# Patient Record
Sex: FEMALE | Race: WHITE | NOT HISPANIC OR LATINO | Employment: UNEMPLOYED | URBAN - METROPOLITAN AREA
[De-identification: names, ages, dates, MRNs, and addresses within clinical notes are randomized per-mention and may not be internally consistent; named-entity substitution may affect disease eponyms.]

---

## 2019-06-14 ENCOUNTER — OFFICE VISIT (OUTPATIENT)
Dept: URGENT CARE | Facility: PHYSICIAN GROUP | Age: 34
End: 2019-06-14
Payer: COMMERCIAL

## 2019-06-14 VITALS
DIASTOLIC BLOOD PRESSURE: 80 MMHG | BODY MASS INDEX: 31.39 KG/M2 | TEMPERATURE: 98.6 F | OXYGEN SATURATION: 96 % | HEIGHT: 67 IN | WEIGHT: 200 LBS | SYSTOLIC BLOOD PRESSURE: 122 MMHG | HEART RATE: 105 BPM

## 2019-06-14 DIAGNOSIS — J30.1 SEASONAL ALLERGIC RHINITIS DUE TO POLLEN: ICD-10-CM

## 2019-06-14 DIAGNOSIS — H60.91 RECURRENT OTITIS EXTERNA, RIGHT: ICD-10-CM

## 2019-06-14 DIAGNOSIS — H10.13 ALLERGIC CONJUNCTIVITIS OF BOTH EYES: ICD-10-CM

## 2019-06-14 PROCEDURE — 99203 OFFICE O/P NEW LOW 30 MIN: CPT | Performed by: PHYSICIAN ASSISTANT

## 2019-06-14 RX ORDER — OLOPATADINE HYDROCHLORIDE 1 MG/ML
1 SOLUTION/ DROPS OPHTHALMIC 2 TIMES DAILY
Qty: 5 ML | Refills: 0 | Status: SHIPPED | OUTPATIENT
Start: 2019-06-14 | End: 2019-07-15

## 2019-06-14 RX ORDER — FLUTICASONE PROPIONATE 50 MCG
1 SPRAY, SUSPENSION (ML) NASAL DAILY
COMMUNITY
End: 2019-07-15

## 2019-06-14 NOTE — PROGRESS NOTES
"Subjective:   Oscar Randall is a 33 y.o. female who presents for Ear Pain (R ear pain, painful, pt states she believes she got water in it, painful to swallow, 1 day )    This is a new problem.  Patient presents to urgent care with concern for possible ear infection in the right ear.  Patient reports a history of recurrent \"double ear infections\" from getting water in her ears.  She states that she tries very hard to keep water and moisture out of the ear however she has just moved into an area that has a different type of shower and while bending over to shave her legs she got water in her ear yesterday.  She tried putting hydrogen peroxide into her ear in an effort to try this out.  However, she has developed pain in the right ear.  The pain hurts to swallow.  Patient denies any recent illness.  Denies fever.    Patient does report issues with increase in allergy symptoms including sneezing, itchy nose and itchy burning eyes.  Patient has been taking Zyrtec-D and fluticasone nasal spray with some improvement in symptoms.  However, she is being very bothered by the burning of the eyes.  She states that several times she has had to pull over while driving because her eyes are stinging and burning.  Patient denies any eye pain or foreign body sensation.  Denies any discharge.    Past medical history, family history and social history are reviewed and updated in the record today.         HPI  Review of Systems   Constitutional: Negative for chills, fever and malaise/fatigue.   HENT: Positive for congestion and ear pain.    Eyes: Positive for redness.   Neurological: Negative for dizziness.   All other systems reviewed and are negative.    Allergies   Allergen Reactions   • Amoxicillin         Objective:   /80 (BP Location: Right arm, Patient Position: Sitting, BP Cuff Size: Adult)   Pulse (!) 105   Temp 37 °C (98.6 °F) (Temporal)   Ht 1.702 m (5' 7\")   Wt 90.7 kg (200 lb)   SpO2 96%   BMI 31.32 kg/m² "   Physical Exam   Constitutional: She is oriented to person, place, and time. She appears well-developed and well-nourished. She does not appear ill. No distress.   HENT:   Head: Normocephalic and atraumatic.   Right Ear: Tympanic membrane and external ear normal.   Left Ear: Tympanic membrane, external ear and ear canal normal.   Nose: Nose normal.   Mouth/Throat: Uvula is midline, oropharynx is clear and moist and mucous membranes are normal. No oropharyngeal exudate.   Turbinates edematous and boggy   Right EAC with mild erythema with white debris, mild tragal tenderness on the right   Eyes: Pupils are equal, round, and reactive to light. EOM are normal. Right eye exhibits no discharge and no exudate. Left eye exhibits no discharge and no exudate. Right conjunctiva is injected. Left conjunctiva is injected.   Neck: Normal range of motion. Neck supple.   Cardiovascular: Normal rate, regular rhythm and normal heart sounds.  Exam reveals no gallop and no friction rub.    No murmur heard.  Pulmonary/Chest: Effort normal and breath sounds normal. No respiratory distress. She has no wheezes. She has no rales.   Abdominal: Soft. Bowel sounds are normal. She exhibits no distension and no mass. There is no tenderness. There is no rebound and no guarding.   Musculoskeletal: Normal range of motion. She exhibits no edema, tenderness or deformity.   Lymphadenopathy:        Head (right side): No submental, no submandibular, no tonsillar, no preauricular and no posterior auricular adenopathy present.        Head (left side): No submental, no submandibular, no tonsillar, no preauricular and no posterior auricular adenopathy present.     She has no cervical adenopathy.        Right: No supraclavicular adenopathy present.        Left: No supraclavicular adenopathy present.   Neurological: She is alert and oriented to person, place, and time. She has normal strength. No cranial nerve deficit or sensory deficit. Coordination normal.    Skin: Skin is warm and dry. No rash noted.   Psychiatric: She has a normal mood and affect. Judgment normal.   Vitals reviewed.         Assessment/Plan:   Assessment    1. Recurrent otitis externa, right  - neomycin sulf/polymyx B sulf/HC soln (CORTISPORIN HC SOL) 3.5-39055-0 Solution; Place 3 Drops in ear 4 times a day.  Dispense: 1 Bottle; Refill: 0    2. Seasonal allergic rhinitis due to pollen    3. Allergic conjunctivitis of both eyes  - olopatadine (PATANOL) 0.1 % ophthalmic solution; Place 1 Drop in both eyes 2 times a day.  Dispense: 5 mL; Refill: 0      Patient will be treated with Cortisporin otic suspension as above.  Continue over-the-counter nonsedating antihistamine and nasal steroid.  Patient is also given an additional prescription for Patanol with recommendation to follow-up with optometry/ophthalmology.      Differential diagnosis, natural history, supportive care, and indications for immediate follow-up discussed.      If not improving in 3-5 days, F/U with PCP or return to  or sooner if worsens  Red flag warning symptoms and strict ER/follow-up precautions given.    Please note that this note was created using voice recognition speech to text software. Every effort has been made to correct obvious errors.  However, I expect there are errors of grammar and possibly context that were not discovered prior to finalizing the note    LIDIA Paige PA-C

## 2019-06-14 NOTE — PATIENT INSTRUCTIONS
"Otitis Externa  Otitis externa is a germ infection in the outer ear. The outer ear is the area from the eardrum to the outside of the ear. Otitis externa is sometimes called \"swimmer's ear.\"  HOME CARE  · Put drops in the ear as told by your doctor.  · Only take medicine as told by your doctor.  · If you have diabetes, your doctor may give you more directions. Follow your doctor's directions.  · Keep all doctor visits as told.  To avoid another infection:  · Keep your ear dry. Use the corner of a towel to dry your ear after swimming or bathing.  · Avoid scratching or putting things inside your ear.  · Avoid swimming in lakes, dirty water, or pools that use a chemical called chlorine poorly.  · You may use ear drops after swimming. Combine equal amounts of white vinegar and alcohol in a bottle. Put 3 or 4 drops in each ear.  GET HELP IF:   · You have a fever.  · Your ear is still red, puffy (swollen), or painful after 3 days.  · You still have yellowish-white fluid (pus) coming from the ear after 3 days.  · Your redness, puffiness, or pain gets worse.  · You have a really bad headache.  · You have redness, puffiness, pain, or tenderness behind your ear.  MAKE SURE YOU:   · Understand these instructions.  · Will watch your condition.  · Will get help right away if you are not doing well or get worse.  This information is not intended to replace advice given to you by your health care provider. Make sure you discuss any questions you have with your health care provider.  Document Released: 06/05/2009 Document Revised: 01/08/2016 Document Reviewed: 09/26/2016  [employer] Interactive Patient Education © 2017 [employer]  Allergic Conjunctivitis  A clear membrane (conjunctiva) covers the white part of your eye and the inner surface of your eyelid. Allergic conjunctivitis happens when this membrane has inflammation. This is caused by allergies. Common causes of allergic reactions (allergens)include:  · Outdoor allergens, " such as:  ¨ Pollen.  ¨ Grass and weeds.  ¨ Mold spores.  · Indoor allergens, such as:  ¨ Dust.  ¨ Smoke.  ¨ Mold.  ¨ Pet dander.  ¨ Animal hair.  This condition can make your eye red or pink. It can also make your eye feel itchy. This condition cannot be spread from one person to another person (is not contagious).  Follow these instructions at home:  · Try not to be around things that you are allergic to.  · Take or apply over-the-counter and prescription medicines only as told by your doctor. These include any eye drops.  · Place a cool, clean washcloth on your eye for 10-20 minutes. Do this 3-4 times a day.  · Do not touch or rub your eyes.  · Do not wear contact lenses until the inflammation is gone. Wear glasses instead.  · Do not wear eye makeup until the inflammation is gone.  · Keep all follow-up visits as told by your doctor. This is important.  Contact a doctor if:  · Your symptoms get worse.  · Your symptoms do not get better with treatment.  · You have mild eye pain.  · You are sensitive to light,  · You have spots or blisters on your eyes.  · You have pus coming from your eye.  · You have a fever.  Get help right away if:  · You have redness, swelling, or other symptoms in only one eye.  · Your vision is blurry.  · You have vision changes.  · You have very bad eye pain.  Summary  · Allergic conjunctivitis is caused by allergies. It can make your eye red or pink, and it can make your eye feel itchy.  · This condition cannot be spread from one person to another person (is not contagious).  · Try not to be around things that you are allergic to.  · Take or apply over-the-counter and prescription medicines only as told by your doctor. These include any eye drops.  · Contact your doctor if your symptoms get worse or they do not get better with treatment.  This information is not intended to replace advice given to you by your health care provider. Make sure you discuss any questions you have with your health  care provider.  Document Released: 06/07/2011 Document Revised: 08/11/2017 Document Reviewed: 08/11/2017  [employer] Interactive Patient Education © 2017 [employer]

## 2019-06-15 ASSESSMENT — ENCOUNTER SYMPTOMS
FEVER: 0
DIZZINESS: 0
EYE REDNESS: 1
CHILLS: 0

## 2019-07-15 ENCOUNTER — OFFICE VISIT (OUTPATIENT)
Dept: URGENT CARE | Facility: PHYSICIAN GROUP | Age: 34
End: 2019-07-15
Payer: COMMERCIAL

## 2019-07-15 ENCOUNTER — APPOINTMENT (OUTPATIENT)
Dept: RADIOLOGY | Facility: IMAGING CENTER | Age: 34
End: 2019-07-15
Attending: NURSE PRACTITIONER
Payer: COMMERCIAL

## 2019-07-15 VITALS
RESPIRATION RATE: 16 BRPM | SYSTOLIC BLOOD PRESSURE: 128 MMHG | TEMPERATURE: 98.8 F | HEIGHT: 67 IN | BODY MASS INDEX: 30.76 KG/M2 | OXYGEN SATURATION: 96 % | WEIGHT: 196 LBS | DIASTOLIC BLOOD PRESSURE: 72 MMHG | HEART RATE: 74 BPM

## 2019-07-15 DIAGNOSIS — S99.911A INJURY OF RIGHT ANKLE, INITIAL ENCOUNTER: ICD-10-CM

## 2019-07-15 DIAGNOSIS — S82.831A CLOSED FRACTURE OF DISTAL END OF RIGHT FIBULA, UNSPECIFIED FRACTURE MORPHOLOGY, INITIAL ENCOUNTER: ICD-10-CM

## 2019-07-15 PROCEDURE — 99214 OFFICE O/P EST MOD 30 MIN: CPT | Performed by: NURSE PRACTITIONER

## 2019-07-15 PROCEDURE — 73610 X-RAY EXAM OF ANKLE: CPT | Mod: TC,RT | Performed by: NURSE PRACTITIONER

## 2019-07-15 RX ORDER — LORATADINE 10 MG/1
CAPSULE, LIQUID FILLED ORAL
COMMUNITY

## 2019-07-15 RX ORDER — HYDROCODONE BITARTRATE AND ACETAMINOPHEN 5; 325 MG/1; MG/1
1-2 TABLET ORAL EVERY 6 HOURS PRN
Qty: 15 TAB | Refills: 0 | Status: SHIPPED | OUTPATIENT
Start: 2019-07-15 | End: 2019-07-20

## 2019-07-15 NOTE — PROGRESS NOTES
"  Chief Complaint   Patient presents with   • Ankle Injury     heard a crack, fell down stairs X today        HISTORY OF PRESENT ILLNESS: Patient is a 34 y.o. female who presents to urgent care today with complaints of right ankle pain. Notes she accidentally tripped while ambulating down stairs earlier today. She felt her right foot pull backwards, felt a \"crack\" to her ankle and has had pain and swelling to the ankle since.  The pain and swelling is lateral.  Been unable to ambulate due to pain.  Denies previous injuries to this ankle.  Denies other areas of injury or trauma.  She is tried ice and rest for symptom relief.    There are no active problems to display for this patient.      Allergies:Amoxicillin    Current Outpatient Prescriptions Ordered in [facility]   Medication Sig Dispense Refill   • Cetirizine-Pseudoephedrine (ZYRTEC-D PO) Take  by mouth.     • Loratadine 10 MG Cap Take  by mouth.       No current Epic-ordered facility-administered medications on file.        Past Medical History:   Diagnosis Date   • Allergy    • Asthma        Social History   Substance Use Topics   • Smoking status: Never Smoker   • Smokeless tobacco: Never Used   • Alcohol use Not on file       Family Status   Relation Status   • Mo Alive   • Fa Alive     Family History   Problem Relation Age of Onset   • Hypertension Mother    • Heart Disease Mother        ROS:  Review of Systems   Constitutional: Negative for fever, chills, weight loss, malaise, and fatigue.   HENT: Negative for ear pain, nosebleeds, congestion, sore throat and neck pain.    Eyes: Negative for vision changes.   Neuro: Negative for headache, sensory changes, weakness, seizure, LOC.   Cardiovascular: Negative for chest pain, palpitations, orthopnea and leg swelling.   Respiratory: Negative for cough, sputum production, shortness of breath and wheezing.   Gastrointestinal: Negative for abdominal pain, nausea, vomiting or diarrhea.   Genitourinary: Negative for " "dysuria, urgency and frequency.  Musculoskeletal: Positive for right ankle pain.  Negative for falls, neck pain, back pain, myalgias.   Skin: Negative for rash, diaphoresis.     Exam:  /72   Pulse 74   Temp 37.1 °C (98.8 °F)   Resp 16   Ht 1.702 m (5' 7\")   Wt 88.9 kg (196 lb)   SpO2 96%   General: well-nourished, well-developed female in NAD  Head: normocephalic, atraumatic  Eyes: PERRLA, no conjunctival injection, acuity grossly intact, lids normal.  Ears: normal shape and symmetry, no tenderness, no discharge. External canals are without any significant edema or erythema. Tympanic membranes are without any inflammation, no effusion. Gross auditory acuity is intact.  Nose: symmetrical without tenderness, no discharge.  Mouth/Throat: reasonable hygiene, no erythema, exudates or tonsillar enlargement.  Neck: no masses, range of motion within normal limits, no tracheal deviation. No obvious thyroid enlargement.   Lymph: no cervical adenopathy. No supraclavicular adenopathy.   Neuro: alert and oriented. Cranial nerves 1-12 grossly intact. No sensory deficit.   Cardiovascular: regular rate and rhythm. No edema.  Pulmonary: no distress. Chest is symmetrical with respiration, no wheezes, crackles, or rhonchi.   Musculoskeletal: no clubbing, appropriate muscle tone, gait is antalgic. Right ankle has swelling and tenderness to lateral ankle. Limited ROM secondary to pain. N/V intact. Skin intact. Cap refill brisk.  There is no tenderness to right foot or proximal right tibia or fibula.  Skin: warm, dry, intact, no clubbing, no cyanosis, no rashes.   Psych: appropriate mood, affect, judgement.         Assessment/Plan:  1. Closed fracture of distal end of right fibula, unspecified fracture morphology, initial encounter  DX-ANKLE 3+ VIEWS RIGHT    REFERRAL TO ORTHOPEDICS    HYDROcodone-acetaminophen (NORCO) 5-325 MG Tab per tablet    Consent for Opiate Prescription         DX ankle reviewed by myself, radiology " "reading \"Distal right fibular fracture possible ankle joint ligamentous injury.\"        X-ray notes distal fibular fracture. Placed in tall walking boot, given crutches, instructed to be non weight bearing. RICE. OTC NSAIDS. [place] for breath through pain, sedative effects of medication discussed with patient. Consent obtained. Referral placed to ortho, F/U this week. [place]  Aware web site evaluation: I have obtained and reviewed patient utilization report from [facility] pharmacy database prior to writing prescription for controlled substance II, III or IV per [place] bill . Based on the report and my clinical assessment the prescription is medically necessary.   Supportive care, differential diagnoses, and indications for immediate follow-up discussed with patient.   Pathogenesis of diagnosis discussed including typical length and natural progression.   Instructed to return to clinic or nearest emergency department for any change in condition, further concerns, or worsening of symptoms.  Patient states understanding of the plan of care and discharge instructions.          Please note that this dictation was created using voice recognition software. I have made every reasonable attempt to correct obvious errors, but I expect that there are errors of grammar and possibly content that I did not discover before finalizing the note.      YESSENIA Oswald.     "

## 2019-12-02 ENCOUNTER — TELEPHONE (OUTPATIENT)
Dept: SCHEDULING | Facility: IMAGING CENTER | Age: 34
End: 2019-12-02

## 2019-12-04 ENCOUNTER — APPOINTMENT (OUTPATIENT)
Dept: RADIOLOGY | Facility: IMAGING CENTER | Age: 34
End: 2019-12-04
Attending: PHYSICIAN ASSISTANT
Payer: COMMERCIAL

## 2019-12-04 ENCOUNTER — OFFICE VISIT (OUTPATIENT)
Dept: URGENT CARE | Facility: PHYSICIAN GROUP | Age: 34
End: 2019-12-04
Payer: COMMERCIAL

## 2019-12-04 VITALS
RESPIRATION RATE: 16 BRPM | HEART RATE: 98 BPM | DIASTOLIC BLOOD PRESSURE: 78 MMHG | SYSTOLIC BLOOD PRESSURE: 122 MMHG | TEMPERATURE: 97.8 F | BODY MASS INDEX: 30.7 KG/M2 | HEIGHT: 67 IN | OXYGEN SATURATION: 98 %

## 2019-12-04 DIAGNOSIS — M25.511 ACUTE PAIN OF RIGHT SHOULDER: ICD-10-CM

## 2019-12-04 DIAGNOSIS — M75.31 CALCIFIC TENDINITIS OF RIGHT SHOULDER: Primary | ICD-10-CM

## 2019-12-04 DIAGNOSIS — M75.41 IMPINGEMENT SYNDROME OF RIGHT SHOULDER: ICD-10-CM

## 2019-12-04 PROCEDURE — 20610 DRAIN/INJ JOINT/BURSA W/O US: CPT | Performed by: PHYSICIAN ASSISTANT

## 2019-12-04 PROCEDURE — 73030 X-RAY EXAM OF SHOULDER: CPT | Mod: TC,RT | Performed by: PHYSICIAN ASSISTANT

## 2019-12-04 PROCEDURE — 99214 OFFICE O/P EST MOD 30 MIN: CPT | Mod: 25 | Performed by: PHYSICIAN ASSISTANT

## 2019-12-04 NOTE — PATIENT INSTRUCTIONS
Shoulder Impingement Syndrome  Shoulder impingement syndrome is a condition that causes pain when connective tissues (tendons) surrounding the shoulder joint become pinched. These tendons are part of the group of muscles and tissues that help to stabilize the shoulder (rotator cuff). Beneath the rotator cuff is a fluid-filled sac (bursa) that allows the muscles and tendons to glide smoothly. The bursa may become swollen or irritated (bursitis). Bursitis, swelling in the rotator cuff tendons, or both conditions can decrease how much space is under a bone in the shoulder joint (acromion), resulting in impingement.  What are the causes?  Shoulder impingement syndrome can be caused by bursitis or swelling of the rotator cuff tendons, which may result from:  · Repetitive overhead arm movements.  · Falling onto the shoulder.  · Weakness in the shoulder muscles.  What increases the risk?  You may be more likely to develop this condition if you are an athlete who participates in:  · Sports that involve throwing, such as baseball.  · Tennis.  · Swimming.  · Volleyball.  Some people are also more likely to develop impingement syndrome because of the shape of their acromion bone.  What are the signs or symptoms?  The main symptom of this condition is pain on the front or side of the shoulder. Pain may:  · Get worse when lifting or raising the arm.  · Get worse at night.  · Wake you up from sleeping.  · Feel sharp when the shoulder is moved, and then fade to an ache.  Other signs and symptoms may include:  · Tenderness.  · Stiffness.  · Inability to raise the arm above shoulder level or behind the body.  · Weakness.  How is this diagnosed?  This condition may be diagnosed based on:  · Your symptoms.  · Your medical history.  · A physical exam.  · Imaging tests, such as:  ¨ X-rays.  ¨ MRI.  ¨ Ultrasound.  How is this treated?  Treatment for this condition may include:  · Resting your shoulder and avoiding all activities that  cause pain or put stress on the shoulder.  · Icing your shoulder.  · NSAIDs to help reduce pain and swelling.  · One or more injections of medicines to numb the area and reduce inflammation.  · Physical therapy.  · Surgery. This may be needed if nonsurgical treatments have not helped. Surgery may involve repairing the rotator cuff, reshaping the acromion, or removing the bursa.  Follow these instructions at home:  Managing pain, stiffness, and swelling  · If directed, apply ice to the injured area.  ¨ Put ice in a plastic bag.  ¨ Place a towel between your skin and the bag.  ¨ Leave the ice on for 20 minutes, 2-3 times a day.  Activity  · Rest and return to your normal activities as told by your health care provider. Ask your health care provider what activities are safe for you.  · Do exercises as told by your health care provider.  General instructions  · Do not use any tobacco products, including cigarettes, chewing tobacco, or e-cigarettes. Tobacco can delay healing. If you need help quitting, ask your health care provider.  · Ask your health care provider when it is safe for you to drive.  · Take over-the-counter and prescription medicines only as told by your health care provider.  · Keep all follow-up visits as told by your health care provider. This is important.  How is this prevented?  · Give your body time to rest between periods of activity.  · Be safe and responsible while being active to avoid falls.  · Maintain physical fitness, including strength and flexibility.  Contact a health care provider if:  · Your symptoms have not improved after 1-2 months of treatment and rest.  · You cannot lift your arm away from your body.  This information is not intended to replace advice given to you by your health care provider. Make sure you discuss any questions you have with your health care provider.  Document Released: 12/18/2006 Document Revised: 08/24/2017 Document Reviewed: 11/19/2016  [employer]  Patient Education © 2017 [employer]     Statement Selected

## 2019-12-04 NOTE — PROGRESS NOTES
"Subjective:      Pt is a 34 y.o. female who presents with Shoulder Pain (right shoulder pain not allowing her to sleep, dose not remember hitting it or anything, x1 month )            HPI  This is a new problem. Pt notes she \"slept wrong\" on her right shoulder 1 month ago and now notes 1 month of pain of right shoulder with movement at or above shoulder height. Pt has not taken any Rx medications for this condition. Pt states the pain is a 7/10, aching in nature and worse during the day. Pt denies CP, SOB, NVD, paresthesias, headaches, dizziness, change in vision, hives, or other joint pain. The pt's medication list, problem list, and allergies have been evaluated and reviewed during today's visit.    PMH:  Past Medical History:   Diagnosis Date   • Allergy    • Asthma        PSH:  Past Surgical History:   Procedure Laterality Date   • BLADDER SUSPENSION         Fam Hx:    family history includes Heart Disease in her mother; Hypertension in her mother.  Family Status   Relation Name Status   • Mo  Alive   • Fa  Alive       Soc HX:  Social History     Socioeconomic History   • Marital status:      Spouse name: Not on file   • Number of children: Not on file   • Years of education: Not on file   • Highest education level: Not on file   Occupational History   • Not on file   Social Needs   • Financial resource strain: Not on file   • Food insecurity:     Worry: Not on file     Inability: Not on file   • Transportation needs:     Medical: Not on file     Non-medical: Not on file   Tobacco Use   • Smoking status: Never Smoker   • Smokeless tobacco: Never Used   Substance and Sexual Activity   • Alcohol use: Not on file   • Drug use: No   • Sexual activity: Not on file   Lifestyle   • Physical activity:     Days per week: Not on file     Minutes per session: Not on file   • Stress: Not on file   Relationships   • Social connections:     Talks on phone: Not on file     Gets together: Not on file     Attends " "Restorationist service: Not on file     Active member of club or organization: Not on file     Attends meetings of clubs or organizations: Not on file     Relationship status: Not on file   • Intimate partner violence:     Fear of current or ex partner: Not on file     Emotionally abused: Not on file     Physically abused: Not on file     Forced sexual activity: Not on file   Other Topics Concern   • Not on file   Social History Narrative   • Not on file         Medications:    Current Outpatient Medications:   •  Loratadine 10 MG Cap, Take  by mouth., Disp: , Rfl:   •  Cetirizine-Pseudoephedrine (ZYRTEC-D PO), Take  by mouth., Disp: , Rfl:       Allergies:  Amoxicillin    ROS    Constitutional: Negative for fever, chills and malaise/fatigue.   HENT: Negative for congestion and sore throat.    Eyes: Negative for blurred vision, double vision and photophobia.   Respiratory: Negative for cough and shortness of breath.  Cardiovascular: Negative for chest pain and palpitations.   Gastrointestinal: Negative for heartburn, nausea, vomiting, abdominal pain, diarrhea and constipation.   Genitourinary: Negative for dysuria and flank pain.   Musculoskeletal: POS for right shoulder joint pain and myalgias.   Skin: Negative for itching and rash.   Neurological: Negative for dizziness, tingling and headaches.   Endo/Heme/Allergies: Does not bruise/bleed easily.   Psychiatric/Behavioral: Negative for depression. The patient is not nervous/anxious.         Objective:     /78 (BP Location: Right arm, Patient Position: Sitting, BP Cuff Size: Adult)   Pulse 98   Temp 36.6 °C (97.8 °F) (Temporal)   Resp 16   Ht 1.702 m (5' 7\")   SpO2 98%   BMI 30.70 kg/m²      Physical Exam  Musculoskeletal:      Right shoulder: She exhibits decreased range of motion, tenderness, bony tenderness, pain and decreased strength. She exhibits no swelling, no effusion, no crepitus, no deformity, no laceration, no spasm and normal pulse.        " Arms:            Constitutional: PT is oriented to person, place, and time. PT appears well-developed and well-nourished. No distress.   HENT:   Head: Normocephalic and atraumatic.   Mouth/Throat: Oropharynx is clear and moist. No oropharyngeal exudate.   Eyes: Conjunctivae normal and EOM are normal. Pupils are equal, round, and reactive to light.   Neck: Normal range of motion. Neck supple. No thyromegaly present.   Cardiovascular: Normal rate, regular rhythm, normal heart sounds and intact distal pulses.  Exam reveals no gallop and no friction rub.    No murmur heard.  Pulmonary/Chest: Effort normal and breath sounds normal. No respiratory distress. PT has no wheezes. PT has no rales. Pt exhibits no tenderness.   Abdominal: Soft. Bowel sounds are normal. PT exhibits no distension and no mass. There is no tenderness. There is no rebound and no guarding.   Neurological: PT is alert and oriented to person, place, and time. PT has normal reflexes. No cranial nerve deficit.   Skin: Skin is warm and dry. No rash noted. PT is not diaphoretic. No erythema.       Psychiatric: PT has a normal mood and affect. PT behavior is normal. Judgment and thought content normal.     RADS:  DX-SHOULDER 2+ RIGHT   Order: [id number]   Status:  Final result   Visible to patient:  No (Not Released)   Next appt:  12/31/2019 at 08:20 AM [facility] (Faith Mustafa, A.P.R.N.)   Dx:  Acute pain of right shoulder   Details     Reading Physician Reading Date Result Priority   Awa Chaudhry M.D. 12/4/2019       Narrative & Impression        12/4/2019 10:01 AM     HISTORY/REASON FOR EXAM:  Right shoulder pain for one month.        TECHNIQUE/EXAM DESCRIPTION AND NUMBER OF VIEWS:  3 views of the RIGHT shoulder.     COMPARISON: None     FINDINGS:  There is no evidence of displaced  fracture or dislocation.     There are globular calcifications projecting just above the superior-lateral humeral head in the region of the rotator cuff. There  is no significant degenerative spurring in the AC joint or glenohumeral joint.     The visualized lung parenchyma is clear.     There are no displaced rib fractures in this field-of-view.     IMPRESSION:     1.  There are changes of calcific tendinitis involving the rotator cuff insertion.            Last Resulted: 12/04/19 10:19 AM                  Assessment/Plan:       1. Calcific tendinitis right    2. Impingement syndrome of right shoulder      3. Acute pain of right shoulder    - DX-SHOULDER 2+ RIGHT; Future      Procedure: shoulder injection  RICE therapy discussed  Gentle ROM exercises discussed  WBAT RUE  Ice/heat therapy discussed  OTC ibuprofen for pain control  Rest, fluids encouraged.  AVS with medical info given.  Pt was in full understanding and agreement with the plan.  Follow-up as needed if symptoms worsen or fail to improve.

## 2019-12-04 NOTE — PROCEDURES
Joint Inj - LG  Date/Time: 12/4/2019 10:04 AM  Authorized by: Prince Peters P.A.-C.   Performed by: Prince Peters P.A.-C.   Consent given by: patient  Site marked: site marked  Timeout: Immediately prior to procedure a time out was called to verify the correct patient, procedure, equipment, support staff and site/side marked as required   Supporting Documentation  Indications: pain   Anesthesia   Local anesthesia used?: local anesthesia not used   Sedation   Patient sedated?: patient not sedated   Procedure Details  Location: shoulder - R glenohumeral  Preparation: Patient was prepped and draped in the usual sterile fashion  Needle size: 18 G  Approach: posterior  Triamcinolone amount: 40 mg  Lidocaine 2% amount: 9 mL  No aspirate  Patient tolerance: patient tolerated the procedure well with no immediate complications

## 2019-12-23 ENCOUNTER — TELEPHONE (OUTPATIENT)
Dept: URGENT CARE | Facility: CLINIC | Age: 34
End: 2019-12-23

## 2019-12-25 ENCOUNTER — HOSPITAL ENCOUNTER (EMERGENCY)
Facility: MEDICAL CENTER | Age: 34
End: 2019-12-25
Attending: EMERGENCY MEDICINE
Payer: COMMERCIAL

## 2019-12-25 VITALS
BODY MASS INDEX: 31.63 KG/M2 | OXYGEN SATURATION: 98 % | SYSTOLIC BLOOD PRESSURE: 168 MMHG | TEMPERATURE: 98.4 F | DIASTOLIC BLOOD PRESSURE: 98 MMHG | HEART RATE: 82 BPM | HEIGHT: 67 IN | WEIGHT: 201.5 LBS | RESPIRATION RATE: 18 BRPM

## 2019-12-25 DIAGNOSIS — M75.41 SHOULDER IMPINGEMENT SYNDROME, RIGHT: Primary | ICD-10-CM

## 2019-12-25 PROCEDURE — 700102 HCHG RX REV CODE 250 W/ 637 OVERRIDE(OP): Performed by: EMERGENCY MEDICINE

## 2019-12-25 PROCEDURE — 700111 HCHG RX REV CODE 636 W/ 250 OVERRIDE (IP): Performed by: EMERGENCY MEDICINE

## 2019-12-25 PROCEDURE — A9270 NON-COVERED ITEM OR SERVICE: HCPCS | Performed by: EMERGENCY MEDICINE

## 2019-12-25 PROCEDURE — 99284 EMERGENCY DEPT VISIT MOD MDM: CPT

## 2019-12-25 PROCEDURE — 96372 THER/PROPH/DIAG INJ SC/IM: CPT

## 2019-12-25 RX ORDER — CYCLOBENZAPRINE HCL 10 MG
10 TABLET ORAL ONCE
Status: COMPLETED | OUTPATIENT
Start: 2019-12-25 | End: 2019-12-25

## 2019-12-25 RX ORDER — CYCLOBENZAPRINE HCL 10 MG
10 TABLET ORAL 3 TIMES DAILY PRN
Qty: 21 TAB | Refills: 0 | Status: SHIPPED | OUTPATIENT
Start: 2019-12-25 | End: 2020-01-01

## 2019-12-25 RX ORDER — KETOROLAC TROMETHAMINE 30 MG/ML
30 INJECTION, SOLUTION INTRAMUSCULAR; INTRAVENOUS ONCE
Status: COMPLETED | OUTPATIENT
Start: 2019-12-25 | End: 2019-12-25

## 2019-12-25 RX ORDER — DICLOFENAC POTASSIUM 50 MG/1
1 POWDER, FOR SOLUTION ORAL
Qty: 15 EACH | Refills: 0 | Status: SHIPPED | OUTPATIENT
Start: 2019-12-25 | End: 2019-12-30

## 2019-12-25 RX ADMIN — CYCLOBENZAPRINE 10 MG: 10 TABLET, FILM COATED ORAL at 19:30

## 2019-12-25 RX ADMIN — KETOROLAC TROMETHAMINE 30 MG: 30 INJECTION, SOLUTION INTRAMUSCULAR at 19:29

## 2019-12-26 NOTE — ED NOTES
Patient verbalized understanding to plan of care and discharge information. Patient in stable condition. No signs of distress. Patient pain free. Patient ambulatory out of ED to personal vehicle with stable gait with  to drive home.

## 2019-12-26 NOTE — ED PROVIDER NOTES
ED Provider Note    Scribed for PRANAV Porras* by Bev Thakkar. 12/25/2019, 7:06 PM.    Primary care provider: None noted  Means of arrival: Walk-in  History obtained from: [facility]  History limited by: None    CHIEF COMPLAINT  Chief Complaint   Patient presents with   • Shoulder Pain     ongoing issues with her Right shoulder. Had a pain shot in her arm, but it has since worn off       HPI  Oscar Randall is a 34 y.o. female who presents to the Emergency Department for acute, waxing and waning, moderate right shoulder pain onset two months ago with worsening severity over the past week and a half, prompting her to visit the ED today for further evaluation of her condition. The patient reports that two months ago she developed acute, constant, moderate pain to the right shoulder that progressively worsened in severity and prompted her to visit Urgent Care. At Urgent Care, the patient was informed that her symptoms were secondary to shoulder impingement syndrome and medicated the patient with Triamcinolone which provided moderate temporary relief. She states that he staff at Urgent Care informed the patient that the medication should last for up to two years and instructed the patient to revisit Urgent Care or the ED for recurrent or worsening pain. Over the past week and half, the patient notes that she developed recurrent pain to the anterior aspect of the right shoulder that has been progressively worsening in severity since onset. Two days ago, she called Urgent Care and informed them about her recurrent symptom and was informed that she will likely need to have an MRI of her right shoulder for further evaluation of her pain and condition. She notes that her appointment is on 12/30/19, however she states that her pain has been moderately increasing and does not wish to wait until her follow-up appointment. The patient reports that she is experiencing the most pain to the anterior right shoulder and  notes that she has associated symptoms of mild soreness to the right shoulder blade.She describes her pain as aching in nature and notes that she has been taking Ibuprofen, applying over the counter pain ointment and has been using online physical therapy exercises with temporary minimal relief. Her last dose of Ibuprofen was earlier this afternoon.  Exacerbating factors include sitting down or laying down in the supine position. The patient notes that she is also experiencing decreased range of motion of the right upper extremity secondary to pain and occasional tingling to the right shoulder that is exacerbated with long periods of keeping the right upper extremity in the anatomical position. She notes that she is right-handed. Denies recent symptoms of neck pain, left shoulder pain, fevers, chills or trauma to the right shoulder.  The patient denies surgical history to the respective area. She denies past medical history of Diabetes or hypertension. She denies taking blood thinners. The patient is allergic to Amoxicillin.       REVIEW OF SYSTEMS  Pertinent positives include right shoulder pain, right shoulder blade soreness. Pertinent negatives include no neck pain, left shoulder pain, fevers, chills or trauma to the right shoulder.      PAST MEDICAL HISTORY   has a past medical history of Allergy and Asthma.    SURGICAL HISTORY   has a past surgical history that includes bladder suspension.    SOCIAL HISTORY  Social History     Tobacco Use   • Smoking status: Never Smoker   • Smokeless tobacco: Never Used   Substance Use Topics   • Drug use: No      Social History     Substance and Sexual Activity   Drug Use No       FAMILY HISTORY  Family History   Problem Relation Age of Onset   • Hypertension Mother    • Heart Disease Mother        CURRENT MEDICATIONS  Home Medications     Reviewed by Vince Ledezma R.N. (Registered Nurse) on 12/25/19 at 1846  Med List Status: <None>   Medication Last Dose Status  "  Cetirizine-Pseudoephedrine (ZYRTEC-D PO)  Active   Loratadine 10 MG Cap  Active                ALLERGIES  Allergies   Allergen Reactions   • Amoxicillin        PHYSICAL EXAM  VITAL SIGNS: BP (!) 182/115   Pulse 85   Temp 36.6 °C (97.9 °F) (Temporal)   Resp 16   Ht 1.702 m (5' 7\")   Wt 91.4 kg (201 lb 8 oz)   SpO2 97%   BMI 31.56 kg/m²     General: Alert, moderate acute distress  Skin: Warm, dry, normal for ethnicity  Head: Normocephalic, atraumatic  Neck: Trachea midline. No tenderness or step-off's to the cervical spine  Eye: PERRL, normal conjunctiva  ENMT: Oral mucosa moist, no pharyngeal erythema or exudate  Cardiovascular: Regular rate and rhythm, No murmur, Normal peripheral perfusion  Respiratory: Lungs CTA, respirations are non-labored, breath sounds are equal  Musculoskeletal: No swelling, no deformity. See extremities for further details.   Extremities: 2+ radial pulses. Brisk capillary refill on right upper extremity. There is no deformity, erythema or warmth to the right shoulder. There is no tenderness, erythema, deformity to the right scapula or right AC joint. Point tenderness to the anterior aspect of the glenohumeral joint. Active and passive range of motion of the right upper extremity, but restricted secondary to pain but in tact to abduction,adduction, internal and external rotation  Neurological: Alert and oriented to person, place, time, and situation. Full flexion and extension of digits of the right hand against resistance. Sensation to light touch grossly intact   Lymphatics: No lymphadenopathy  Psychiatric: Cooperative, appropriate mood & affect      DIAGNOSTIC STUDIES/PROCEDURES    RADIOLOGY  MR-SHOULDER-W/O RIGHT    (Results Pending)     The radiologist's interpretation of all radiological studies have been reviewed by me.    COURSE & MEDICAL DECISION MAKING  Pertinent Labs & Imaging studies reviewed. (See chart for details)    7:06 PM - Patient seen and examined at bedside. " Patient presents to the ED complaining of waxing and waning right shoulder pain onset two months ago with recurrent pain for the past week and a half. Discussed plan of care with patient which includes medicating the patient with an anti-inflammatory and a muscle relaxant in the ED. Her right upper extremity will be placed in a sling and an MRI of her right shoulder will be ordered and she was instructed to obtain her outpatient MRI and review the results with her primary care physician. She will be discharged with a prescription for an anti-inflammatory and a muscle relaxant for further pain relief. Patient verbalizes her understanding and agreement to the plan of care. Patient will be treated with Toradol 30 mg and Flexeril 10 mg. Ordered MRI shoulder right for outpatient treatment. The differential diagnoses include but are not limited to: shoulder impingement. rotator cuff tendinitis, degenerative joint disease    7:37 PM Recheck. The patient reports that her pain has improved after the administration of Flexeril and Toradol. At this time, she appears well-hydrated and is stable for discharge. She was given discharge instructions which include getting proper amounts of rest, following up with her primary care physician, taking her medications as prescribed and applying ice and heat to the affected areas. She will be given a discharge instruction sheet on shoulder impingement syndrome. The patient will be discharged with a prescription for Flexeril 10 mg which is to be taken up to three times daily for pain control and Diclofenac Potassium 50 mg which is to be taken up to three times daily for pain/inflammation. She was given a referral to Dr. Melgoza and instructed to immediately return to the ED if her symptoms worsen. Patient verbalizes their understanding and agreement to plan of discharge.         Patient Vitals for the past 24 hrs:   BP Temp Temp src Pulse Resp SpO2 Height Weight   12/25/19 1948 (!)  "168/98 36.9 °C (98.4 °F) -- 82 18 98 % -- --   12/25/19 1842 (!) 182/115 36.6 °C (97.9 °F) Temporal 85 16 97 % 1.702 m (5' 7\") 91.4 kg (201 lb 8 oz)         Decision Making:  Neurovascularly intact 34-year-old presents for evaluation of atraumatic pain of the right shoulder.  This is been going on for approximately 1 month.  Patient placed no recent injury, pain was improved after injection of lidocaine and triamcinolone by outpatient physician assistant.  Patient has pain worsening for the last a week, does not radiate into the hand, she has no neck pain, no numbness, no weakness, no evidence of a neuropathy or radicular etiology.  She has no contralateral symptoms, suspect shoulder impingement is high of the differential but also will consider rotator cuff tendinitis and degenerative joint disease.  Patient has had an unremarkable x-ray of, I think MRI is indicated given the chronicity and worsening nature of her pain as well as orthopedic referral.  She has no fever, there is no redness over the joint, no warmth, no evidence of septic arthritis.  Patient treated with sling for comfort, instructed to remove the sling on occasion use only when needed to avoid frozen shoulder.  Will write for NSAID and muscle relaxer.    The patient will return for new or worsening symptoms and is stable at the time of discharge.    HTN/IDDM FOLLOW UP:  The patient is referred to a primary physician for blood pressure management, diabetic screening, and for all other preventive health concerns    DISPOSITION:  Patient will be discharged home in stable condition.    FOLLOW UP:  Garth Melgoza M.D.  [address]  [facility] [address]  [phone]    Schedule an appointment as soon as possible for a visit         OUTPATIENT MEDICATIONS:  Discharge Medication List as of 12/25/2019  7:33 PM      START taking these medications    Details   Diclofenac Potassium 50 MG Pack Take 1 Tab by mouth 3 times a day, with meals for 5 " days.As needed for pain/inflammationDisp-15 Each, R-0, Print Rx Paper      cyclobenzaprine (FLEXERIL) 10 MG Tab Take 1 Tab by mouth 3 times a day as needed for Muscle Spasms for up to 7 days., Disp-21 Tab, R-0, Print Rx Paper         Ordered outpatient MRI, right shoulder    FINAL IMPRESSION  1. Shoulder impingement syndrome, right          Bev GRANADO (Scribe), am scribing for, and in the presence of, VERNA Porras.    Electronically signed by: Bev Thakkar (Scribe), 12/25/2019    I, VERNA oPrras personally performed the services described in this documentation, as scribed by Bev Thakkar in my presence, and it is both accurate and complete.    E    The note accurately reflects work and decisions made by me.  Cori Parson  12/25/2019  8:08 PM

## 2019-12-26 NOTE — ED TRIAGE NOTES
Include Location In Plan?: Yes
Patient ambulatory to the ED with no Acute distress noted  Chief Complaint   Patient presents with   • Shoulder Pain     ongoing issues with her Right shoulder. Had a pain shot in her arm, but it has since worn off     Wasn't able to see her PCP for additional pain medication.    After triage, patient educated on flow of the ED and placement in Room. Informed patient to contact staff if S/Sx get worse. Patient placed in the main waiting room      
Detail Level: Generalized

## 2019-12-31 ENCOUNTER — OFFICE VISIT (OUTPATIENT)
Dept: MEDICAL GROUP | Facility: PHYSICIAN GROUP | Age: 34
End: 2019-12-31
Payer: COMMERCIAL

## 2019-12-31 VITALS
SYSTOLIC BLOOD PRESSURE: 132 MMHG | HEIGHT: 67 IN | OXYGEN SATURATION: 99 % | WEIGHT: 198 LBS | TEMPERATURE: 98.3 F | DIASTOLIC BLOOD PRESSURE: 80 MMHG | HEART RATE: 94 BPM | BODY MASS INDEX: 31.08 KG/M2

## 2019-12-31 DIAGNOSIS — Z23 NEED FOR VACCINATION: ICD-10-CM

## 2019-12-31 DIAGNOSIS — L65.9 HAIR THINNING: ICD-10-CM

## 2019-12-31 DIAGNOSIS — N91.5 OLIGOMENORRHEA, UNSPECIFIED TYPE: ICD-10-CM

## 2019-12-31 DIAGNOSIS — F52.6 SEXUAL PAIN DISORDER: ICD-10-CM

## 2019-12-31 DIAGNOSIS — L70.0 ACNE VULGARIS: ICD-10-CM

## 2019-12-31 DIAGNOSIS — Z13.21 ENCOUNTER FOR VITAMIN DEFICIENCY SCREENING: ICD-10-CM

## 2019-12-31 DIAGNOSIS — Z13.29 SCREENING FOR THYROID DISORDER: ICD-10-CM

## 2019-12-31 DIAGNOSIS — M77.8 RIGHT SHOULDER TENDINITIS: ICD-10-CM

## 2019-12-31 DIAGNOSIS — Z13.6 SCREENING FOR CARDIOVASCULAR CONDITION: ICD-10-CM

## 2019-12-31 DIAGNOSIS — Z00.00 ROUTINE ADULT HEALTH MAINTENANCE: ICD-10-CM

## 2019-12-31 DIAGNOSIS — Z13.0 SCREENING FOR DEFICIENCY ANEMIA: ICD-10-CM

## 2019-12-31 DIAGNOSIS — L85.8 KERATOSIS PILARIS: ICD-10-CM

## 2019-12-31 DIAGNOSIS — T85.9XXS COMPLICATION OF IMPLANTED VAGINAL MESH, UNSPECIFIED COMPLICATION, SEQUELA: ICD-10-CM

## 2019-12-31 PROBLEM — T85.9XXA COMPLICATION OF IMPLANTED VAGINAL MESH: Status: ACTIVE | Noted: 2019-12-31

## 2019-12-31 PROCEDURE — 90471 IMMUNIZATION ADMIN: CPT | Performed by: NURSE PRACTITIONER

## 2019-12-31 PROCEDURE — 90472 IMMUNIZATION ADMIN EACH ADD: CPT | Performed by: NURSE PRACTITIONER

## 2019-12-31 PROCEDURE — 90715 TDAP VACCINE 7 YRS/> IM: CPT | Performed by: NURSE PRACTITIONER

## 2019-12-31 PROCEDURE — 99214 OFFICE O/P EST MOD 30 MIN: CPT | Mod: 25 | Performed by: NURSE PRACTITIONER

## 2019-12-31 PROCEDURE — 90686 IIV4 VACC NO PRSV 0.5 ML IM: CPT | Performed by: NURSE PRACTITIONER

## 2019-12-31 RX ORDER — COVID-19 ANTIGEN TEST
KIT MISCELLANEOUS
COMMUNITY
End: 2020-02-13

## 2019-12-31 RX ORDER — ACETAMINOPHEN 500 MG
500-1000 TABLET ORAL EVERY 6 HOURS PRN
COMMUNITY

## 2019-12-31 SDOH — HEALTH STABILITY: MENTAL HEALTH: HOW OFTEN DO YOU HAVE 6 OR MORE DRINKS ON ONE OCCASION?: LESS THAN MONTHLY

## 2019-12-31 NOTE — PROGRESS NOTES
Chief Complaint   Patient presents with   • Shoulder Pain     Rt shoulder          Subjective:     Oscar Randall is a 34 y.o. female presenting to \[facility]\"" care.  Unfortunately she is been dealing with multiple acute and chronic issues that she would like to address today.    Right shoulder pain: Beginning of December patient experienced limited range of motion of the right shoulder along with pain.  Went to [facility] urgent care and received a corticosteroid injection.  Reports this helped for several weeks, last progressively worn off and the pain is returned.  Went to [facility] emergency room roughly 1 week ago, x-ray done showed calcified tendinitis and patient was given diclofenac.  She is taking this along with Aleve and acetaminophen.  This is giving her some relief.  She has regained range of motion, however her shoulder is prone to overuse, shaking, stiffness.    Vaginal mesh complication: After patient experienced multiple episodes of spontaneous incontinence, she had vaginal mesh placement in 8/20/2018 this was done in [place].  Patient experienced temporary bladder control afterwards, however her  reported pain with sex.  She tried topical estrogen cream with little relief.  Bladder incontinence returned and she experienced a prolapse, second surgery was done in 12/20/2018.  However the surgeon noted later that though this should improve her bladder control, the tissue around the vaginal mesh was thin and her  may still feel it during intercourse.  She would like a referral to an OB, and surgeon to further discuss this and have this evaluated.  She reports this has taken a dramatic toll on her sexual intimacy with her .  They are in a monogamous relationship.  No concerns for infection.  Unsure when her last Pap smear was due, this will be deferred for OB/GYN.  No normal discharge.  Better bladder control now.    Irregular periods: Patient has experienced lifelong irregular  periods.  This was mitigated by prior hormonal birth control use and Mirena placement.  However she has not been on any birth control or had an IUD in the past several years after her  had a vasectomy..  Continues to be regular.  Denies any breast discharge.  Has noticed increased hair growth on her chin and neck.  Noticed worsening skin changes and acne.  Struggling to lose weight, feels that she gains it quite easily.    Thinning hair: Patient's sister does have thyroid.  Patient is wondering if this could be part of her weight issue, hair issue as well.  She has not previously had this evaluated    Skin rash: Patient has rash on bilateral upper arms.  She reports is also on anterior surface of bilateral thighs as well.  Denies itching or pain.  Says that she is gotten this evaluated before and doctor said she could take some steroid creams but this would not cause these to permanently go away, just a temporary relief.  Reports 1 of her children also has this.  Said that this was much improved when she used to tan, however now she avoids UV exposure and this has worsened.    Health maintenance: She is due for Tdap and a flu shot.  We discussed this at length including risk benefits and alternatives as well as the pertussis outbreak in [facility].  Patient is amenable to receiving both of these today.  Pap smears likely due, however these have been deferred due to OB/GYN referral.    Review of systems:  See above.     Denies chest pain, shortness of breath, dizziness, severe headache, altered cognition, changes in bowel or bladder habits, decreased sensation, decreased strength, numbness or tingling.      Current Outpatient Medications:   •  Naproxen Sodium (ALEVE) 220 MG Cap, Take  by mouth., Disp: , Rfl:   •  acetaminophen (TYLENOL) 500 MG Tab, Take 500-1,000 mg by mouth every 6 hours as needed., Disp: , Rfl:   •  cyclobenzaprine (FLEXERIL) 10 MG Tab, Take 1 Tab by mouth 3 times a day  "as needed for Muscle Spasms for up to 7 days., Disp: 21 Tab, Rfl: 0  •  Loratadine 10 MG Cap, Take  by mouth., Disp: , Rfl:   •  Cetirizine-Pseudoephedrine (ZYRTEC-D PO), Take  by mouth., Disp: , Rfl:     Allergies, past medical history, past surgical history, family history, social history reviewed and updated    Objective:     Vitals: /80 (BP Location: Left arm, Patient Position: Sitting, BP Cuff Size: Adult)   Pulse 94   Temp 36.8 °C (98.3 °F) (Temporal)   Ht 1.702 m (5' 7\")   Wt 89.8 kg (198 lb)   SpO2 99%   BMI 31.01 kg/m²   General: Alert, cooperative, dressed appropriately for weather / situation  HEENT: Normocephalic.  EOMI, no icterus or pallor.  Conjunctivae clear without erythema / irritation. External ears developed; Bilateral nasal turbinates nonerythematous, nonedematous.  Oropharynx non-erythematous, mucous membranes moist; Neck supple, absent of cervical or supraclavicular lymphadenopathy.  Thyroid palpated, free of masses or goiter.   Heart: Regular rate and rhythm.  S1 and S2 normal.  No murmurs auscultated; no murmurs / bruits heard over bilateral carotids.  Bilateral radial pulses strong and equal.  Bilateral posterior tibial pulses strong and equal.  Respiratory: Normal respiratory effort.  Clear to auscultation bilaterally.  AP ratio 1:2   Abdomen: Non-distended; Soft, nontender with deep palpation; no palpable organomegaly present  Skin: Visible skin intact, dry, without rash.  Musculoskeletal: Negative drop arm test.  Notable stiffness with right arm rotation, however range of motion is intact.  No edema, erythema, tenderness to palpation.  Gait is normal.  Bilateral  strength strong equal.  Moves extremities freely and equally bilaterally  Skin: Small maculopapular points over bilateral upper extremities neuro:  AAOx3 Visual tracking intact, no nystagmus;   Psych:  Affect/mood is normal, judgement is good, memory is intact, grooming is appropriate.    Assessment/Plan: "     Oscar was seen today for shoulder pain.    Diagnoses and all orders for this visit:    Oligomenorrhea, unspecified type: We discussed the potential of polycystic ovarian syndrome and will evaluate for hormonal imbalances.  Due to patient's irregular periods, increased hair growth on chin, and difficulty losing weight this is a potential etiology.  We will revisit this after she obtains her labs.  -     TESTOSTERONE SERUM; Future  -     FSH/LH; Future  -     17-OH PROGESTERONE; Future  -     PROLACTIN; Future  -     HCG QUAL SERUM; Future    Right shoulder tendinitis: Discussed benefits of an graduated nonsurgical approach.  Patient has MRI ordered and was instructed to schedule this and obtain a prior to scheduling appointment with sports medicine.  This information will be beneficial for full assessment and treatment planning.  -I recommended she stop taking over-the-counter Aleve while she is finishing the diclofenac.  She can increase her 500 mg acetaminophen to 3 times a day as needed for pain relief.  Advised to increase hydration to at least 2 L of water per day for renal protection.  -     REFERRAL TO PHYSICAL THERAPY Reason for Therapy: Eval/Treat/Report  -     REFERRAL TO SPORTS MEDICINE    Hair thinning: Evaluate thyroid function    Keratosis pilaris: Discussed hereditary association with this.  Advised patient to continue using non-scented soaps and gentle moisturizers.  Over-the-counter lotion targeted towards this is available, recommended AmLactin.  There are daily preparations of this that she can use, or more concentrated versions to use intermittently as needed for cosmetic purposes.      Acne vulgaris: We will evaluate her hormone levels to determine etiology.  May benefit from a topical antibiotic, currently her acne is not severe, not cystic in nature.  Small comedones visualized scattered across face.    Complication of implanted vaginal mesh, unspecified complication, sequela  -      REFERRAL TO OB/GYN SURGERY    Sexual pain disorder  -     REFERRAL TO OB/GYN SURGERY    Routine adult health maintenance  -     Influenza Vaccine Quad Injection (PF)  -     Patient identified as having weight management issue.  Appropriate orders and counseling given.  -     Tdap Vaccine =>8YO IM    Need for vaccination  -     Influenza Vaccine Quad Injection (PF)  -     Tdap Vaccine =>8YO IM    Screening for cardiovascular condition  -     Lipid Profile; Future    Encounter for vitamin deficiency screening  -     CBC WITH DIFFERENTIAL; Future  -     Comp Metabolic Panel; Future  -     VITAMIN D,25 HYDROXY; Future    Screening for thyroid disorder  -     TSH WITH REFLEX TO FT4; Future    Screening for deficiency anemia  -     CBC WITH DIFFERENTIAL; Future          Return in about 6 weeks (around 2/11/2020).    Patient verbalized understanding and agreed to plan of care.  Encouraged to contact me with needs via [employer] or by phone if needed.      I have placed the above orders and discussed them with an approved delegating provider.  The MA is performing the below orders under the direction of Dr. Mohr.    Please note that this dictation was created using voice recognition software. I have made every reasonable attempt to correct obvious errors, but I expect that there are errors of grammar and possibly content that I did not discover before finalizing the note.

## 2020-01-13 ENCOUNTER — OFFICE VISIT (OUTPATIENT)
Dept: MEDICAL GROUP | Facility: CLINIC | Age: 35
End: 2020-01-13
Payer: COMMERCIAL

## 2020-01-13 VITALS
OXYGEN SATURATION: 96 % | HEART RATE: 115 BPM | BODY MASS INDEX: 31.08 KG/M2 | HEIGHT: 67 IN | TEMPERATURE: 98.3 F | SYSTOLIC BLOOD PRESSURE: 124 MMHG | RESPIRATION RATE: 18 BRPM | DIASTOLIC BLOOD PRESSURE: 80 MMHG | WEIGHT: 198 LBS

## 2020-01-13 DIAGNOSIS — M75.31 CALCIFIC TENDONITIS OF RIGHT SHOULDER REGION: ICD-10-CM

## 2020-01-13 PROCEDURE — 99203 OFFICE O/P NEW LOW 30 MIN: CPT | Performed by: FAMILY MEDICINE

## 2020-01-13 NOTE — PROGRESS NOTES
CHIEF COMPLAINT:  Oscar Randall female presenting at the request of ANTHONY Douglas  for evaluation of Shoulder pain.     Oscar Randall is complaining of right shoulder pain  Insidious onset October 2019, had a few falls after a leg injury in August 2019  Pain is at the deltoid region  Quality is aching, sharp with sudden or certain movements, articularly activity in front of her  Pain is Non-radiating  Aggravated by movement and overhead activity  Improved with  anti-inflammatories   no prior problems with this shoulder in the past  Prior Treatments: Corticosteroid injection at  on 12/4/19  Prior studies: X-Ray   Medications tried for pain include: diclofenac and cyclobenzaprine  Mechanical Symptom history: No Locking   POSITIVE night symptoms  She has been doing some online PT exercises     Stay at home mom, 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 No pertinent past medical history.    PMH:  has a past medical history of Allergy and Asthma. She also has no past medical history of Diabetes ([facility]) or Hypertension.  MEDS:   Current Outpatient Medications:   •  Naproxen Sodium (ALEVE) 220 MG Cap, Take  by mouth., Disp: , Rfl:   •  acetaminophen (TYLENOL) 500 MG Tab, Take 500-1,000 mg by mouth every 6 hours as needed., Disp: , Rfl:   •  Loratadine 10 MG Cap, Take  by mouth., Disp: , Rfl:   •  Cetirizine-Pseudoephedrine (ZYRTEC-D PO), Take  by mouth., Disp: , Rfl:   ALLERGIES:   Allergies   Allergen Reactions   • Amoxicillin      SURGHX:   Past Surgical History:   Procedure Laterality Date   • BLADDER SUSPENSION       SOCHX:  reports that she has never smoked. She has never used smokeless tobacco. She reports current alcohol use. She reports that she does not use drugs.  FH: Family history was reviewed,  "no pertinent findings to report     PHYSICAL EXAM:  /80 (BP Location: Left arm, Patient Position: Sitting, BP Cuff Size: Large adult)   Pulse (!) 115   Temp 36.8 °C (98.3 °F) (Temporal)   Resp 18   Ht 1.702 m (5' 7\")   Wt 89.8 kg (198 lb)   SpO2 96%   BMI 31.01 kg/m²      slightly overweight in no apparent distress, alert and oriented x 3.  Gait: normal    Cervical spine:  Range of motion Intact  Spurling's testing is NEGATIVE  Cervical spine tenderness NEGATIVE    Strength testing:     Deltoid, bilateral 5/5  Bicep, bilateral 5/5  Tricep, bilateral 5/5  Wrist Extension, bilateral 5/5  Wrist Flexion, bilateral 5/5  Finger Abduction, bilateral 5/5    Sensation:  Mild decreased sensation at lateral upper arm (no specific dermatomal pattern)  Otherwise INTACT Bilaterally        Reflexes:   Biceps: R 2+/L 2+  Triceps: R 2+/L  2+  Brachial radialis R 2+/L  2+  Portillo's testing is NEGATIVE  The arms are otherwise neurovascularly intact     Shoulder Exam:    RIGHT Shoulder:  No visible swelling   Range of motion DIMINISHED with pain  Tenderness: Supraspinatous tenderness  Empty Can Testing 4/5  Internal Rotation 4/5  External Rotation 5/5  Lift Off Testing 5/5  Impingement testing Coello  POSITIVE  Neer's testing POSITIVE    LEFT Shoulder:  No visible swelling   Range of motion INTACT  Tenderness: Non-tender  Empty Can Testing 5/5  Internal Rotation 5/5  External Rotation 5/5  Lift Off Testing 5/5  Impingement testing Coello  NEGATIVE  Neer's testing NEGATIVE    Additional Findings: None    1. Calcific tendonitis of right shoulder region  REFERRAL TO PHYSIATRY (PMR)     Insidious onset October 2019, had a few falls after a leg injury in August 2019 without significant injury immediately after  Extraordinarily large calcification which is likely due to prior falls that she suffered while she was injured (right lower leg)  She has FAILED subacromial corticosteroid injection (only helped for 2 and half " weeks)    Given the size of the calcification, recommend physiatry evaluation for consideration of Tenex procedure    Return if symptoms worsen or fail to improve.         12/4/2019 10:01 AM   HISTORY/REASON FOR EXAM: Right shoulder pain for one month.   TECHNIQUE/EXAM DESCRIPTION AND NUMBER OF VIEWS: 3 views of the RIGHT shoulder.   COMPARISON: None   FINDINGS:   There is no evidence of displaced fracture or dislocation.   There are globular calcifications projecting just above the superior-lateral humeral head in the region of the rotator cuff. There is no significant degenerative spurring in the AC joint or glenohumeral joint.   The visualized lung parenchyma is clear.   There are no displaced rib fractures in this field-of-view.   IMPRESSION:   1. There are changes of calcific tendinitis involving the rotator cuff insertion.    done elsewhere and reviewed independently by me    Thank you YESSENIA Douglas. for allowing me to participate in caring for your patient.

## 2020-01-14 DIAGNOSIS — M75.31 CALCIFIC TENDINITIS OF RIGHT SHOULDER: ICD-10-CM

## 2020-01-14 RX ORDER — TRAMADOL HYDROCHLORIDE 50 MG/1
50 TABLET ORAL EVERY 8 HOURS PRN
Qty: 20 TAB | Refills: 0 | Status: SHIPPED | OUTPATIENT
Start: 2020-01-14 | End: 2020-01-21

## 2020-01-14 NOTE — PROGRESS NOTES
1. Calcific tendinitis of right shoulder  tramadol (ULTRAM) 50 MG Tab     Called and spoke with patient  Had some increase in her pain without any specific injury  Significant pain with internal rotation of the RIGHT shoulder    She does have physiatry evaluation/referral pending

## 2020-01-15 ENCOUNTER — OFFICE VISIT (OUTPATIENT)
Dept: PHYSICAL MEDICINE AND REHAB | Facility: MEDICAL CENTER | Age: 35
End: 2020-01-15
Payer: COMMERCIAL

## 2020-01-15 VITALS
HEART RATE: 116 BPM | SYSTOLIC BLOOD PRESSURE: 128 MMHG | BODY MASS INDEX: 30.93 KG/M2 | WEIGHT: 197.09 LBS | OXYGEN SATURATION: 96 % | DIASTOLIC BLOOD PRESSURE: 70 MMHG | HEIGHT: 67 IN | TEMPERATURE: 98 F

## 2020-01-15 DIAGNOSIS — M75.31 CALCIFIC TENDINITIS OF RIGHT SHOULDER: ICD-10-CM

## 2020-01-15 DIAGNOSIS — M75.91 RIGHT SUPRASPINATUS TENDINITIS: ICD-10-CM

## 2020-01-15 DIAGNOSIS — G89.29 CHRONIC RIGHT SHOULDER PAIN: ICD-10-CM

## 2020-01-15 DIAGNOSIS — M25.511 CHRONIC RIGHT SHOULDER PAIN: ICD-10-CM

## 2020-01-15 DIAGNOSIS — Z78.9 IMPAIRED MOBILITY AND ADLS: ICD-10-CM

## 2020-01-15 DIAGNOSIS — Z74.09 IMPAIRED MOBILITY AND ADLS: ICD-10-CM

## 2020-01-15 PROCEDURE — 99204 OFFICE O/P NEW MOD 45 MIN: CPT | Mod: 25 | Performed by: PHYSICAL MEDICINE & REHABILITATION

## 2020-01-15 PROCEDURE — 76882 US LMTD JT/FCL EVL NVASC XTR: CPT | Performed by: PHYSICAL MEDICINE & REHABILITATION

## 2020-01-15 ASSESSMENT — PAIN SCALES - GENERAL: PAINLEVEL: 9=SEVERE PAIN

## 2020-01-15 NOTE — PROGRESS NOTES
New patient note    Physiatry (physical medicine and  Rehabilitation), interventional spine and sports medicine    Date of service: See epic    Chief complaint:   Chief Complaint   Patient presents with   • New Patient     Shoulder pain        HISTORY    HPI: Oscar Randall 34 y.o. female who presents today with Diagnoses of Calcific tendinitis of right shoulder, Right supraspinatus tendinitis, Impaired mobility and ADLs, and Chronic right shoulder pain were pertinent to this visit.    HPI    Chronic right lateral shoulder pain, decreased ROM, 6-7/10 intensity. Nonradiating. Intermittent pain. She has tried stretching with no improvement. Tried a steroid injection with no improvement. This has been since October 2019. Denies injury.     Difficulty with ADLs including toileting, upper body dressing.     She has tried alieve, tylenol, flexeril. She tried tramadol but stopped because of cognitive side effects.        Medical records review:  I reviewed the note from the referring provider Vikash Gil M.D. including the note dated 1/13/2020.  A cortisone injection was done in the urgent care on 12/4/2019.  No significant improvement.  The patient is also tried diclofenac and cyclobenzaprine.  Diagnosed with calcific tendinosis of the right shoulder.  Referred to physiatry.      ROS:   Red Flags ROS:   Fever, Chills, Sweats: Denies  Involuntary Weight Loss: Denies  Bladder Incontinence: Denies  Bowel Incontinence: denies  Saddle Anesthesia: Denies    All other systems reviewed and negative.       PMHx:   Past Medical History:   Diagnosis Date   • Allergy    • Asthma          Current Outpatient Medications on File Prior to Visit   Medication Sig Dispense Refill   • tramadol (ULTRAM) 50 MG Tab Take 1 Tab by mouth every 8 hours as needed for Moderate Pain for up to 7 days. 20 Tab 0   • Naproxen Sodium (ALEVE) 220 MG Cap Take  by mouth.     • acetaminophen (TYLENOL) 500 MG Tab Take 500-1,000 mg by mouth every 6  hours as needed.     • Loratadine 10 MG Cap Take  by mouth.     • Cetirizine-Pseudoephedrine (ZYRTEC-D PO) Take  by mouth.       No current facility-administered medications on file prior to visit.         PSHx:   Past Surgical History:   Procedure Laterality Date   • BLADDER SUSPENSION         Family history   Family History   Problem Relation Age of Onset   • Hypertension Mother    • Heart Disease Mother    • Thyroid Sister    • Hypertension Brother          Medications: reviewed on epic.   Outpatient Medications Marked as Taking for the 1/15/20 encounter (Office Visit) with Joe Elder M.D.   Medication Sig Dispense Refill   • Diclofenac Sodium 1 % Gel Apply 3 g to skin as directed 4 times a day as needed (pain). 150 g 3   • tramadol (ULTRAM) 50 MG Tab Take 1 Tab by mouth every 8 hours as needed for Moderate Pain for up to 7 days. 20 Tab 0   • Naproxen Sodium (ALEVE) 220 MG Cap Take  by mouth.     • acetaminophen (TYLENOL) 500 MG Tab Take 500-1,000 mg by mouth every 6 hours as needed.          Allergies:   Allergies   Allergen Reactions   • Amoxicillin        Social Hx:   Social History     Socioeconomic History   • Marital status:      Spouse name: Not on file   • Number of children: Not on file   • Years of education: Not on file   • Highest education level: Not on file   Occupational History   • Not on file   Social Needs   • Financial resource strain: Not on file   • Food insecurity:     Worry: Not on file     Inability: Not on file   • Transportation needs:     Medical: Not on file     Non-medical: Not on file   Tobacco Use   • Smoking status: Never Smoker   • Smokeless tobacco: Never Used   Substance and Sexual Activity   • Alcohol use: Yes     Binge frequency: Less than monthly   • Drug use: No   • Sexual activity: Yes     Partners: Male   Lifestyle   • Physical activity:     Days per week: Not on file     Minutes per session: Not on file   • Stress: Not on file   Relationships   • Social  "connections:     Talks on phone: Not on file     Gets together: Not on file     Attends [employer] service: Not on file     Active member of club or organization: Not on file     Attends meetings of clubs or organizations: Not on file     Relationship status: Not on file   • Intimate partner violence:     Fear of current or ex partner: Not on file     Emotionally abused: Not on file     Physically abused: Not on file     Forced sexual activity: Not on file   Other Topics Concern   •  Service No   • Blood Transfusions No   • Caffeine Concern Yes   • Occupational Exposure No   • Hobby Hazards No   • Sleep Concern No   • Stress Concern No   • Weight Concern No   • Special Diet No   • Back Care No   • Exercise No   • Bike Helmet No   • Seat Belt Yes   • Self-Exams No   Social History Narrative   • Not on file         EXAMINATION     Physical Exam:   Vitals: /70 (BP Location: Right arm, Patient Position: Sitting, BP Cuff Size: Adult)   Pulse (!) 116   Temp 36.7 °C (98 °F) (Temporal)   Ht 1.702 m (5' 7\")   Wt 89.4 kg (197 lb 1.5 oz)   SpO2 96%     Constitutional:   Body Habitus: Body mass index is 30.87 kg/m².  Cooperation: Fully cooperates with exam  Appearance: Well-groomed, well-nourished, not disheveled     Eyes: No scleral icterus to suggest severe liver disease, no proptosis to suggest severe hyperthyroid    ENT -no obvious auditory deficits, no obvious tongue lesions, tongue midline, no facial droop     Skin -no rashes or lesions noted     Respiratory-  breathing comfortable on room air, no audible wheezing    Cardiovascular- capillary refills less than 2 seconds. No lower extremity edema is noted.     Gastrointestinal - no obvious abdominal masses, No tenderness to palpation in the abdomen    Psychiatric- alert and oriented ×3. Normal affect.     Gait - normal gait, no use of ambulatory device, nonantalgic.     Musculoskeletal -   left Shoulder  Inspection: No rashes are noted on the bilateral " shoulders. Humerus is not grossly high riding when comparing the right and left sides.  Palpation: No tenderness to palpation over the biceps tendon, acromioclavicular joint, scapular spine, supraspinous, infraspinatus, greater tuberosity, lesser tuberosity, trapezius.  Range of motion: Active range of motion forward ark and lateral arc within normal limits. The patient is able to lower his arm slowly with no drop arm.  Special tests:  Neers: negative  Coello: negative  Speeds: Negative  Jürgensen signs: Negative  Empty can: negative  O'Briens test: Negative  Crossarm test: Negative  Resisted internal rotation: Negative  Resisted external rotation: neagtive  Resisted elbow extension: Negative       right Shoulder  Inspection: No rashes are noted on the bilateral shoulders. Humerus is not grossly high riding when comparing the right and left sides.  Palpation: No tenderness to palpation over the biceps tendon, acromioclavicular joint, scapular spine, supraspinous, infraspinatus, greater tuberosity, lesser tuberosity, trapezius.  Range of motion: Decreased range of motion with forward flexion and abduction to less than 90 degrees    Special tests  Neer's and Coello reproduce severe pain.  Empty can test is positive  Speeds: Negative  Jürgensen signs: Negative  O'Briens test: Negative  Crossarm test: Negative  Resisted internal rotation: Positive  Resisted external rotation: neagtive  Resisted elbow extension: Negative     Cervical spine   Inspection: No deformities of the skin over the cervical spine. No rashes or lesions.    full   active range of motion in all directions, without  pain      Spurling’s sign: negative bilaterally    No signs of muscular atrophy in bilateral upper extremities     No tenderness to palpation of the cervical facets        Neuro       Key points for the international standards for neurological classification of spinal cord injury (ISNCSCI) to light touch.     Dermatome R L   C4 2 2   C5  2 2   C6 2 2   C7 2 2   C8 2 2   T1 2 2   T2 2 2                                          Motor Exam Upper Extremities   ? Myotome R L          Elbow flexion C5 5 5   Wrist extension C6 5 5   Elbow extension C7 5 5   Finger flexion C8 5 5   Finger abduction T1 5 5         Portillo’s sign negative bilaterally     Reflexes  ?  R L   Biceps  2+ 2+   Brachioradialis  2+ 2+                        MEDICAL DECISION MAKING    Medical records review: see under HPI section.     DATA    Labs:   No results found for: SODIUM, POTASSIUM, CHLORIDE, CO2, ANION, GLUCOSE, BUN, CREATININE, CALCIUM, ASTSGOT, ALTSGPT, TBILIRUBIN, ALBUMIN, TOTPROTEIN, GLOBULIN, AGRATIO]    No results found for: PROTHROMBTM, INR     No results found for: WBC, RBC, HEMOGLOBIN, HEMATOCRIT, MCV, MCH, MCHC, MPV, NEUTSPOLYS, LYMPHOCYTES, MONOCYTES, EOSINOPHILS, BASOPHILS, HYPOCHROMIA, ANISOCYTOSIS     No results found for: HBA1C     Imaging:   I personally reviewed following images, these are my reads  X-ray right shoulder 12/4/2019.  Large calcification in the expected area of the rotator cuff which is approximately 6 mm x 10 mm.    IMAGING radiology reads. I reviewed the following radiology reads             Results for orders placed in visit on 07/15/19   DX-ANKLE 3+ VIEWS RIGHT    Impression Distal right fibular fracture possible ankle joint ligamentous injury.                                                                          Results for orders placed in visit on 12/04/19   DX-SHOULDER 2+ RIGHT    Impression 1.  There are changes of calcific tendinitis involving the rotator cuff insertion.                     Diagnosis   Visit Diagnoses     ICD-10-CM   1. Calcific tendinitis of right shoulder M75.31   2. Right supraspinatus tendinitis M75.91   3. Impaired mobility and ADLs Z74.09   4. Chronic right shoulder pain M25.511    G89.29           ASSESSMENT AND PLAN:  Ocsar Randall 34 y.o. female      Oscar was seen today for new  patient.    Diagnoses and all orders for this visit:    Calcific tendinitis of right shoulder  -     Diclofenac Sodium 1 % Gel; Apply 3 g to skin as directed 4 times a day as needed (pain).    Right supraspinatus tendinitis  -     Diclofenac Sodium 1 % Gel; Apply 3 g to skin as directed 4 times a day as needed (pain).    Impaired mobility and ADLs  -     Diclofenac Sodium 1 % Gel; Apply 3 g to skin as directed 4 times a day as needed (pain).    Chronic right shoulder pain  -     Diclofenac Sodium 1 % Gel; Apply 3 g to skin as directed 4 times a day as needed (pain).      1/15/2020 I performed a limited diagnostic ultrasound of the right shoulder.  The infraspinatus, subscapularis, biceps tendons appear intact.  The supraspinatus especially the anterior fibers have a large calcification consistent with calcific tendinitis.    The patient has significant calcific tendinitis causing severe pain and functional deficits including impaired ADLs.    PLAN  Physical therapy: I do not think the patient would benefit from physical therapy as her symptoms were flared with simple stretching exercises with a significant calcific tendinitis    Diagnostic workup: I agree with the MRI ordered by Dr. Gil.  I encouraged the patient to get this prior to the next visit    Medications: Discontinue tramadol as this was not effective for the patient.      Interventional program: I will consider the patient for Tenex versus surgical referral depending on the MRI      Follow-up: After the above diagnostic studies           Please note that this dictation was created using voice recognition software. I have made every reasonable attempt to correct obvious errors but there may be errors of grammar and content that I may have overlooked prior to finalization of this note.      Joe Elder MD  Physical Medicine and Rehabilitation  Interventional Spine and Sports Physiatry  [facility]               Vikash Choi M.D. CC  YESSENIA Douglas.

## 2020-01-28 ENCOUNTER — HOSPITAL ENCOUNTER (OUTPATIENT)
Dept: RADIOLOGY | Facility: MEDICAL CENTER | Age: 35
End: 2020-01-28

## 2020-02-13 ENCOUNTER — OFFICE VISIT (OUTPATIENT)
Dept: PHYSICAL MEDICINE AND REHAB | Facility: MEDICAL CENTER | Age: 35
End: 2020-02-13
Payer: COMMERCIAL

## 2020-02-13 VITALS
HEIGHT: 67 IN | TEMPERATURE: 98 F | WEIGHT: 195.99 LBS | BODY MASS INDEX: 30.76 KG/M2 | HEART RATE: 103 BPM | OXYGEN SATURATION: 97 %

## 2020-02-13 DIAGNOSIS — Z74.09 IMPAIRED MOBILITY AND ADLS: ICD-10-CM

## 2020-02-13 DIAGNOSIS — M25.511 CHRONIC RIGHT SHOULDER PAIN: ICD-10-CM

## 2020-02-13 DIAGNOSIS — M25.512 ACUTE PAIN OF LEFT SHOULDER: ICD-10-CM

## 2020-02-13 DIAGNOSIS — Z78.9 IMPAIRED MOBILITY AND ADLS: ICD-10-CM

## 2020-02-13 DIAGNOSIS — M75.102 ROTATOR CUFF SYNDROME OF BOTH SHOULDERS: ICD-10-CM

## 2020-02-13 DIAGNOSIS — G89.29 CHRONIC RIGHT SHOULDER PAIN: ICD-10-CM

## 2020-02-13 DIAGNOSIS — M75.101 ROTATOR CUFF SYNDROME OF BOTH SHOULDERS: ICD-10-CM

## 2020-02-13 DIAGNOSIS — M75.31 CALCIFIC TENDINITIS OF RIGHT SHOULDER: ICD-10-CM

## 2020-02-13 DIAGNOSIS — M75.91 RIGHT SUPRASPINATUS TENDINITIS: ICD-10-CM

## 2020-02-13 PROCEDURE — 99214 OFFICE O/P EST MOD 30 MIN: CPT | Performed by: PHYSICAL MEDICINE & REHABILITATION

## 2020-02-13 ASSESSMENT — PAIN SCALES - GENERAL: PAINLEVEL: 2=MINIMAL-SLIGHT

## 2020-02-13 ASSESSMENT — PATIENT HEALTH QUESTIONNAIRE - PHQ9: CLINICAL INTERPRETATION OF PHQ2 SCORE: 0

## 2020-02-13 NOTE — PROGRESS NOTES
Follow up patient note  Interventional spine and sports physiatry, Physical medicine rehabilitation      Chief complaint:   Chief Complaint   Patient presents with   • Follow-Up     Imaging results    shoulder pain      HISTORY    Please see new patient note by Dr Elder,  for more details.     HPI  Patient identification: Oscar Randall 34 y.o. female  With Diagnoses of Calcific tendinitis of right shoulder, Right supraspinatus tendinitis, Impaired mobility and ADLs, Chronic right shoulder pain, Acute pain of left shoulder, and Rotator cuff syndrome of both shoulders were pertinent to this visit.       -The patient's right shoulder pain was previously severe with significantly decreased range of motion and significant limits to her ADLs has now improved.  She has pain in her bilateral shoulders which she rates is 80% improved.  2/10 in intensity, aching in quality, nonradiating, worse with overhead movements but this is now tolerable.  Denies numbness or tingling sensation.  Denies weakness.       ROS Red Flags :   Fever, Chills, Sweats: Denies  Involuntary Weight Loss: Denies  Bowel/Bladder Incontinence: Denies  Saddle Anesthesia: Denies        PMHx:   Past Medical History:   Diagnosis Date   • Allergy    • Asthma        PSHx:   Past Surgical History:   Procedure Laterality Date   • BLADDER SUSPENSION         Family history   Family History   Problem Relation Age of Onset   • Hypertension Mother    • Heart Disease Mother    • Thyroid Sister    • Hypertension Brother          Medications:   No outpatient medications have been marked as taking for the 2/13/20 encounter (Office Visit) with Joe Elder M.D..        Current Outpatient Medications on File Prior to Visit   Medication Sig Dispense Refill   • Diclofenac Sodium 1 % Gel Apply 3 g to skin as directed 4 times a day as needed (pain). (Patient not taking: Reported on 2/13/2020) 150 g 3   • Naproxen Sodium (ALEVE) 220 MG Cap Take  by mouth.     •  acetaminophen (TYLENOL) 500 MG Tab Take 500-1,000 mg by mouth every 6 hours as needed.     • Loratadine 10 MG Cap Take  by mouth.     • Cetirizine-Pseudoephedrine (ZYRTEC-D PO) Take  by mouth.       No current facility-administered medications on file prior to visit.          Allergies:   Allergies   Allergen Reactions   • Amoxicillin        Social Hx:   Social History     Socioeconomic History   • Marital status:      Spouse name: Not on file   • Number of children: Not on file   • Years of education: Not on file   • Highest education level: Not on file   Occupational History   • Not on file   Social Needs   • Financial resource strain: Not on file   • Food insecurity     Worry: Not on file     Inability: Not on file   • Transportation needs     Medical: Not on file     Non-medical: Not on file   Tobacco Use   • Smoking status: Never Smoker   • Smokeless tobacco: Never Used   Substance and Sexual Activity   • Alcohol use: Yes     Binge frequency: Less than monthly   • Drug use: No   • Sexual activity: Yes     Partners: Male   Lifestyle   • Physical activity     Days per week: Not on file     Minutes per session: Not on file   • Stress: Not on file   Relationships   • Social connections     Talks on phone: Not on file     Gets together: Not on file     Attends Jainism service: Not on file     Active member of club or organization: Not on file     Attends meetings of clubs or organizations: Not on file     Relationship status: Not on file   • Intimate partner violence     Fear of current or ex partner: Not on file     Emotionally abused: Not on file     Physically abused: Not on file     Forced sexual activity: Not on file   Other Topics Concern   •  Service No   • Blood Transfusions No   • Caffeine Concern Yes   • Occupational Exposure No   • Hobby Hazards No   • Sleep Concern No   • Stress Concern No   • Weight Concern No   • Special Diet No   • Back Care No   • Exercise No   • Bike Helmet No   •  "Seat Belt Yes   • Self-Exams No   Social History Narrative   • Not on file         EXAMINATION     Physical Exam:   Vitals: Pulse (!) 103   Temp 36.7 °C (98 °F) (Temporal)   Ht 1.702 m (5' 7\")   Wt 88.9 kg (195 lb 15.8 oz)   SpO2 97%     Constitutional:   Body Habitus: Body mass index is 30.7 kg/m².  Cooperation: Fully cooperates with exam  Appearance: Well-groomed no disheveled    Respiratory-  breathing comfortable on room air, no audible wheezing  Cardiovascular- capillary refills less than 2 seconds. No lower extremity edema is noted.   Psychiatric- alert and oriented ×3. Normal affect.    MSK: -   right and left Shoulder  Inspection: No rashes are noted on the bilateral shoulders. Humerus is not grossly high riding when comparing the right and left sides.  Palpation: No tenderness to palpation over the biceps tendon, acromioclavicular joint, scapular spine, supraspinous, infraspinatus, greater tuberosity, lesser tuberosity, trapezius.  Range of motion: Active range of motion forward ark and lateral arc within normal limits. The patient is able to lower his arm slowly with no drop arm.  Special tests:  Neers: negative  Coello: negative  Speeds: Negative  Jürgensen signs: Negative  Empty can: Mildly positive bilaterally  O'Briens test: Negative  Crossarm test: Negative  Resisted internal rotation: Negative  Resisted external rotation: neagtive  Resisted elbow extension: Negative             MEDICAL DECISION MAKING    DATA    Labs:   No results found for: SODIUM, POTASSIUM, CHLORIDE, CO2, GLUCOSE, BUN, CREATININE, BUNCREATRAT, GLOMRATE     No results found for: PROTHROMBTM, INR     No results found for: WBC, RBC, HEMOGLOBIN, HEMATOCRIT, MCV, MCH, MCHC, MPV, NEUTSPOLYS, LYMPHOCYTES, MONOCYTES, EOSINOPHILS, BASOPHILS, HYPOCHROMIA, ANISOCYTOSIS     No results found for: HBA1C       Imaging:   I personally reviewed following images    X-ray right shoulder 12/4/2019.  Large calcification in the expected area of " the rotator cuff which is approximately 6 mm x 10 mm.       I reviewed the following radiology reports     MRI right shoulder 1/27/2020                                                                                                                                                                                                   Results for orders placed in visit on 07/15/19   DX-ANKLE 3+ VIEWS RIGHT    Impression Distal right fibular fracture possible ankle joint ligamentous injury.                                                                          Results for orders placed in visit on 12/04/19   DX-SHOULDER 2+ RIGHT    Impression 1.  There are changes of calcific tendinitis involving the rotator cuff insertion.                     DIAGNOSIS   Visit Diagnoses     ICD-10-CM   1. Calcific tendinitis of right shoulder M75.31   2. Right supraspinatus tendinitis M75.91   3. Impaired mobility and ADLs Z74.09   4. Chronic right shoulder pain M25.511    G89.29   5. Acute pain of left shoulder M25.512   6. Rotator cuff syndrome of both shoulders M75.101    M75.102         ASSESSMENT and PLAN:     Oscar Randall 34 y.o. female      Oscar was seen today for follow-up.    Diagnoses and all orders for this visit:    Calcific tendinitis of right shoulder  -     REFERRAL TO PHYSICAL THERAPY Reason for Therapy: Eval/Treat/Report    Right supraspinatus tendinitis  -     REFERRAL TO PHYSICAL THERAPY Reason for Therapy: Eval/Treat/Report    Impaired mobility and ADLs  -     REFERRAL TO PHYSICAL THERAPY Reason for Therapy: Eval/Treat/Report    Chronic right shoulder pain  -     REFERRAL TO PHYSICAL THERAPY Reason for Therapy: Eval/Treat/Report    Acute pain of left shoulder  -     REFERRAL TO PHYSICAL THERAPY Reason for Therapy: Eval/Treat/Report    Rotator cuff syndrome of both shoulders  -     REFERRAL TO PHYSICAL THERAPY Reason for Therapy: Eval/Treat/Report        -Discontinue naproxen and topical diclofenac gel.    The  patient is getting improvement with conservative treatment.  We decided to proceed with physical therapy as above.    Should the patient have recurrence of her severe shoulder pain, decreased range of motion and I would consider her for percutaneous tenotomy with TX bone for removal of the calcification.    Follow up: 1 year or sooner PRN    Thank you for allowing me to participate in the care of this patient. If you have any questions please not hesitate to contact me.          Please note that this dictation was created using voice recognition software. I have made every reasonable attempt to correct obvious errors but there may be errors of grammar and content that I may have overlooked prior to finalization of this note.      Joe Elder MD  Interventional Spine and Sports Physiatry  Physical Medicine and Rehabilitation  [facility]

## 2020-03-16 ENCOUNTER — APPOINTMENT (OUTPATIENT)
Dept: PHYSICAL THERAPY | Facility: REHABILITATION | Age: 35
End: 2020-03-16
Attending: PHYSICAL MEDICINE & REHABILITATION
Payer: COMMERCIAL

## 2020-03-25 ENCOUNTER — APPOINTMENT (OUTPATIENT)
Dept: PHYSICAL THERAPY | Facility: REHABILITATION | Age: 35
End: 2020-03-25
Payer: COMMERCIAL

## 2020-03-30 ENCOUNTER — APPOINTMENT (OUTPATIENT)
Dept: PHYSICAL THERAPY | Facility: REHABILITATION | Age: 35
End: 2020-03-30
Payer: COMMERCIAL

## 2020-04-01 ENCOUNTER — APPOINTMENT (OUTPATIENT)
Dept: PHYSICAL THERAPY | Facility: REHABILITATION | Age: 35
End: 2020-04-01
Payer: COMMERCIAL

## 2020-04-06 ENCOUNTER — APPOINTMENT (OUTPATIENT)
Dept: PHYSICAL THERAPY | Facility: REHABILITATION | Age: 35
End: 2020-04-06
Payer: COMMERCIAL

## 2020-04-08 ENCOUNTER — APPOINTMENT (OUTPATIENT)
Dept: PHYSICAL THERAPY | Facility: REHABILITATION | Age: 35
End: 2020-04-08
Payer: COMMERCIAL

## 2020-04-13 ENCOUNTER — APPOINTMENT (OUTPATIENT)
Dept: PHYSICAL THERAPY | Facility: REHABILITATION | Age: 35
End: 2020-04-13
Payer: COMMERCIAL

## 2020-04-15 ENCOUNTER — APPOINTMENT (OUTPATIENT)
Dept: PHYSICAL THERAPY | Facility: REHABILITATION | Age: 35
End: 2020-04-15
Payer: COMMERCIAL

## 2021-02-10 PROBLEM — Z91.199 NO-SHOW FOR APPOINTMENT: Status: ACTIVE | Noted: 2021-02-10
